# Patient Record
Sex: MALE | Race: WHITE | NOT HISPANIC OR LATINO | Employment: FULL TIME | URBAN - METROPOLITAN AREA
[De-identification: names, ages, dates, MRNs, and addresses within clinical notes are randomized per-mention and may not be internally consistent; named-entity substitution may affect disease eponyms.]

---

## 2019-06-12 ENCOUNTER — APPOINTMENT (OUTPATIENT)
Dept: GENERAL RADIOLOGY | Facility: HOSPITAL | Age: 39
End: 2019-06-12

## 2019-06-12 ENCOUNTER — HOSPITAL ENCOUNTER (EMERGENCY)
Facility: HOSPITAL | Age: 39
Discharge: HOME | End: 2019-06-12
Attending: EMERGENCY MEDICINE | Admitting: EMERGENCY MEDICINE

## 2019-06-12 ENCOUNTER — APPOINTMENT (OUTPATIENT)
Dept: CT IMAGING | Facility: HOSPITAL | Age: 39
End: 2019-06-12

## 2019-06-12 VITALS
BODY MASS INDEX: 34.8 KG/M2 | TEMPERATURE: 97.7 F | OXYGEN SATURATION: 94 % | WEIGHT: 235 LBS | SYSTOLIC BLOOD PRESSURE: 120 MMHG | RESPIRATION RATE: 16 BRPM | HEART RATE: 63 BPM | DIASTOLIC BLOOD PRESSURE: 75 MMHG | HEIGHT: 69 IN

## 2019-06-12 DIAGNOSIS — R07.89 ATYPICAL CHEST PAIN: Primary | ICD-10-CM

## 2019-06-12 DIAGNOSIS — R91.1 PULMONARY NODULE: ICD-10-CM

## 2019-06-12 DIAGNOSIS — Z72.0 TOBACCO ABUSE: ICD-10-CM

## 2019-06-12 LAB
ALBUMIN SERPL-MCNC: 4.4 G/DL (ref 3.5–5.2)
ALBUMIN/GLOB SERPL: 1.6 G/DL
ALP SERPL-CCNC: 121 U/L (ref 39–117)
ALT SERPL W P-5'-P-CCNC: 26 U/L (ref 1–41)
ANION GAP SERPL CALCULATED.3IONS-SCNC: 11 MMOL/L
AST SERPL-CCNC: 20 U/L (ref 1–40)
BASOPHILS # BLD AUTO: 0.07 10*3/MM3 (ref 0–0.2)
BASOPHILS NFR BLD AUTO: 0.6 % (ref 0–1.5)
BILIRUB SERPL-MCNC: 0.2 MG/DL (ref 0.2–1.2)
BUN BLD-MCNC: 18 MG/DL (ref 6–20)
BUN/CREAT SERPL: 16.4 (ref 7–25)
CALCIUM SPEC-SCNC: 9.4 MG/DL (ref 8.6–10.5)
CHLORIDE SERPL-SCNC: 102 MMOL/L (ref 98–107)
CO2 SERPL-SCNC: 28 MMOL/L (ref 22–29)
CREAT BLD-MCNC: 1.1 MG/DL (ref 0.76–1.27)
DEPRECATED RDW RBC AUTO: 42 FL (ref 37–54)
EOSINOPHIL # BLD AUTO: 0.46 10*3/MM3 (ref 0–0.4)
EOSINOPHIL NFR BLD AUTO: 3.9 % (ref 0.3–6.2)
ERYTHROCYTE [DISTWIDTH] IN BLOOD BY AUTOMATED COUNT: 13.7 % (ref 12.3–15.4)
GFR SERPL CREATININE-BSD FRML MDRD: 75 ML/MIN/1.73
GLOBULIN UR ELPH-MCNC: 2.7 GM/DL
GLUCOSE BLD-MCNC: 94 MG/DL (ref 65–99)
HCT VFR BLD AUTO: 45.4 % (ref 37.5–51)
HGB BLD-MCNC: 15.2 G/DL (ref 13–17.7)
HOLD SPECIMEN: NORMAL
HOLD SPECIMEN: NORMAL
IMM GRANULOCYTES # BLD AUTO: 0.04 10*3/MM3 (ref 0–0.05)
IMM GRANULOCYTES NFR BLD AUTO: 0.3 % (ref 0–0.5)
LIPASE SERPL-CCNC: 59 U/L (ref 13–60)
LYMPHOCYTES # BLD AUTO: 3.4 10*3/MM3 (ref 0.7–3.1)
LYMPHOCYTES NFR BLD AUTO: 29.1 % (ref 19.6–45.3)
MCH RBC QN AUTO: 28.3 PG (ref 26.6–33)
MCHC RBC AUTO-ENTMCNC: 33.5 G/DL (ref 31.5–35.7)
MCV RBC AUTO: 84.4 FL (ref 79–97)
MONOCYTES # BLD AUTO: 0.64 10*3/MM3 (ref 0.1–0.9)
MONOCYTES NFR BLD AUTO: 5.5 % (ref 5–12)
NEUTROPHILS # BLD AUTO: 7.07 10*3/MM3 (ref 1.7–7)
NEUTROPHILS NFR BLD AUTO: 60.6 % (ref 42.7–76)
NRBC BLD AUTO-RTO: 0 /100 WBC (ref 0–0.2)
NT-PROBNP SERPL-MCNC: <5 PG/ML (ref 5–450)
PLATELET # BLD AUTO: 266 10*3/MM3 (ref 140–450)
PMV BLD AUTO: 11.1 FL (ref 6–12)
POTASSIUM BLD-SCNC: 4.1 MMOL/L (ref 3.5–5.2)
PROT SERPL-MCNC: 7.1 G/DL (ref 6–8.5)
RBC # BLD AUTO: 5.38 10*6/MM3 (ref 4.14–5.8)
SODIUM BLD-SCNC: 141 MMOL/L (ref 136–145)
TROPONIN T SERPL-MCNC: <0.01 NG/ML (ref 0–0.03)
TROPONIN T SERPL-MCNC: <0.01 NG/ML (ref 0–0.03)
WBC NRBC COR # BLD: 11.68 10*3/MM3 (ref 3.4–10.8)
WHOLE BLOOD HOLD SPECIMEN: NORMAL
WHOLE BLOOD HOLD SPECIMEN: NORMAL

## 2019-06-12 PROCEDURE — 83880 ASSAY OF NATRIURETIC PEPTIDE: CPT

## 2019-06-12 PROCEDURE — 84484 ASSAY OF TROPONIN QUANT: CPT

## 2019-06-12 PROCEDURE — 93005 ELECTROCARDIOGRAM TRACING: CPT | Performed by: EMERGENCY MEDICINE

## 2019-06-12 PROCEDURE — 71275 CT ANGIOGRAPHY CHEST: CPT

## 2019-06-12 PROCEDURE — 83690 ASSAY OF LIPASE: CPT

## 2019-06-12 PROCEDURE — 0 IOPAMIDOL PER 1 ML: Performed by: EMERGENCY MEDICINE

## 2019-06-12 PROCEDURE — 71045 X-RAY EXAM CHEST 1 VIEW: CPT

## 2019-06-12 PROCEDURE — 85025 COMPLETE CBC W/AUTO DIFF WBC: CPT

## 2019-06-12 PROCEDURE — 84484 ASSAY OF TROPONIN QUANT: CPT | Performed by: EMERGENCY MEDICINE

## 2019-06-12 PROCEDURE — 99284 EMERGENCY DEPT VISIT MOD MDM: CPT

## 2019-06-12 PROCEDURE — 80053 COMPREHEN METABOLIC PANEL: CPT

## 2019-06-12 PROCEDURE — 93005 ELECTROCARDIOGRAM TRACING: CPT

## 2019-06-12 RX ORDER — ASPIRIN 81 MG/1
81 TABLET ORAL DAILY
Qty: 30 TABLET | Refills: 0 | Status: SHIPPED | OUTPATIENT
Start: 2019-06-12

## 2019-06-12 RX ORDER — SODIUM CHLORIDE 9 MG/ML
125 INJECTION, SOLUTION INTRAVENOUS CONTINUOUS
Status: DISCONTINUED | OUTPATIENT
Start: 2019-06-12 | End: 2019-06-13 | Stop reason: HOSPADM

## 2019-06-12 RX ORDER — NICOTINE 21 MG/24HR
1 PATCH, TRANSDERMAL 24 HOURS TRANSDERMAL EVERY 24 HOURS
Qty: 28 PATCH | Refills: 0 | Status: SHIPPED | OUTPATIENT
Start: 2019-06-12

## 2019-06-12 RX ORDER — NITROGLYCERIN 0.4 MG/1
0.4 TABLET SUBLINGUAL
Qty: 100 TABLET | Refills: 0 | Status: SHIPPED | OUTPATIENT
Start: 2019-06-12

## 2019-06-12 RX ORDER — NICOTINE 21 MG/24HR
1 PATCH, TRANSDERMAL 24 HOURS TRANSDERMAL ONCE
Status: DISCONTINUED | OUTPATIENT
Start: 2019-06-12 | End: 2019-06-13 | Stop reason: HOSPADM

## 2019-06-12 RX ORDER — SODIUM CHLORIDE 0.9 % (FLUSH) 0.9 %
10 SYRINGE (ML) INJECTION AS NEEDED
Status: DISCONTINUED | OUTPATIENT
Start: 2019-06-12 | End: 2019-06-13 | Stop reason: HOSPADM

## 2019-06-12 RX ADMIN — SODIUM CHLORIDE 500 ML: 9 INJECTION, SOLUTION INTRAVENOUS at 20:54

## 2019-06-12 RX ADMIN — IOPAMIDOL 73 ML: 755 INJECTION, SOLUTION INTRAVENOUS at 20:44

## 2020-09-29 ENCOUNTER — HOSPITAL ENCOUNTER (OUTPATIENT)
Dept: ULTRASOUND IMAGING | Facility: HOSPITAL | Age: 40
Discharge: HOME | End: 2020-09-29
Admitting: FAMILY MEDICINE

## 2020-09-29 ENCOUNTER — TRANSCRIBE ORDERS (OUTPATIENT)
Dept: ADMINISTRATIVE | Facility: HOSPITAL | Age: 40
End: 2020-09-29

## 2020-09-29 DIAGNOSIS — N50.811 TESTICULAR PAIN, RIGHT: ICD-10-CM

## 2020-09-29 DIAGNOSIS — N50.811 TESTICULAR PAIN, RIGHT: Primary | ICD-10-CM

## 2020-09-29 PROCEDURE — 76870 US EXAM SCROTUM: CPT

## 2021-08-29 ENCOUNTER — APPOINTMENT (OUTPATIENT)
Dept: GENERAL RADIOLOGY | Facility: HOSPITAL | Age: 41
End: 2021-08-29

## 2021-08-29 ENCOUNTER — HOSPITAL ENCOUNTER (EMERGENCY)
Facility: HOSPITAL | Age: 41
Discharge: HOME | End: 2021-08-29
Attending: STUDENT IN AN ORGANIZED HEALTH CARE EDUCATION/TRAINING PROGRAM | Admitting: STUDENT IN AN ORGANIZED HEALTH CARE EDUCATION/TRAINING PROGRAM

## 2021-08-29 VITALS
HEIGHT: 69 IN | HEART RATE: 70 BPM | SYSTOLIC BLOOD PRESSURE: 119 MMHG | BODY MASS INDEX: 32.58 KG/M2 | RESPIRATION RATE: 18 BRPM | DIASTOLIC BLOOD PRESSURE: 84 MMHG | TEMPERATURE: 98.1 F | OXYGEN SATURATION: 95 % | WEIGHT: 220 LBS

## 2021-08-29 DIAGNOSIS — S46.211A TEAR OF RIGHT BICEPS MUSCLE, INITIAL ENCOUNTER: Primary | ICD-10-CM

## 2021-08-29 PROCEDURE — 99283 EMERGENCY DEPT VISIT LOW MDM: CPT

## 2021-08-29 PROCEDURE — 73030 X-RAY EXAM OF SHOULDER: CPT

## 2021-08-29 PROCEDURE — 73070 X-RAY EXAM OF ELBOW: CPT

## 2021-08-29 RX ORDER — ACETAMINOPHEN 500 MG
1000 TABLET ORAL ONCE
Status: COMPLETED | OUTPATIENT
Start: 2021-08-29 | End: 2021-08-29

## 2021-08-29 RX ORDER — IBUPROFEN 800 MG/1
800 TABLET ORAL ONCE
Status: COMPLETED | OUTPATIENT
Start: 2021-08-29 | End: 2021-08-29

## 2021-08-29 RX ORDER — OXYCODONE HYDROCHLORIDE 5 MG/1
5 TABLET ORAL EVERY 6 HOURS PRN
Qty: 8 TABLET | Refills: 0 | Status: SHIPPED | OUTPATIENT
Start: 2021-08-29 | End: 2021-10-21

## 2021-08-29 RX ADMIN — IBUPROFEN 800 MG: 800 TABLET, FILM COATED ORAL at 21:52

## 2021-08-29 RX ADMIN — ACETAMINOPHEN 1000 MG: 500 TABLET ORAL at 21:52

## 2021-09-02 ENCOUNTER — OFFICE VISIT (OUTPATIENT)
Dept: ORTHOPEDIC SURGERY | Facility: CLINIC | Age: 41
End: 2021-09-02

## 2021-09-02 VITALS
HEART RATE: 77 BPM | DIASTOLIC BLOOD PRESSURE: 92 MMHG | SYSTOLIC BLOOD PRESSURE: 128 MMHG | BODY MASS INDEX: 32.59 KG/M2 | HEIGHT: 69 IN | WEIGHT: 220.02 LBS

## 2021-09-02 DIAGNOSIS — S59.901A INJURY OF RIGHT ELBOW, INITIAL ENCOUNTER: Primary | ICD-10-CM

## 2021-09-02 PROCEDURE — 99204 OFFICE O/P NEW MOD 45 MIN: CPT | Performed by: ORTHOPAEDIC SURGERY

## 2021-09-02 NOTE — PROGRESS NOTES
[facility] Orthopaedic Surgery Clinic Note        Subjective     Pain of the Right Elbow      HPI    Amarjit Monsivais is a 41 y.o. male who presents with new problem of: right elbow pain.  Onset: twisting injury. The issue has been ongoing for 4 day(s). Pain is a 2/10 on the pain scale. Pain is described as dull. Associated symptoms include pain and swelling. The pain is worse with working and leisure; resting and pain medication and/or NSAID improve the pain. Previous treatments have included: bracing and NSAIDS.    I have reviewed the following portions of the patient's history:History of Present Illness and review of systems.        Patient is a right-hand-dominant male who lifts weights 5 days a week who was stabilizing a canoe when he lost his footing and felt a tearing sensation in his right elbow.  Date of injury is 4 days ago.  Patient is here for further evaluation and treatment.        History reviewed. No pertinent past medical history.   Past Surgical History:   Procedure Laterality Date   • APPENDECTOMY     • EYE SURGERY  2007    METAL IN EYE    • FINGER SURGERY  2009    RIGHT THUMB   • HERNIA REPAIR     • KNEE SURGERY  2013      History reviewed. No pertinent family history.  Social History     Socioeconomic History   • Marital status:      Spouse name: Not on file   • Number of children: Not on file   • Years of education: Not on file   • Highest education level: Not on file   Tobacco Use   • Smoking status: Current Every Day Smoker     Types: Cigarettes, Electronic Cigarette   • Smokeless tobacco: Never Used   • Tobacco comment: 2-4 CIGS PER DAY    Substance and Sexual Activity   • Alcohol use: Yes     Alcohol/week: 3.0 - 4.0 standard drinks     Types: 3 - 4 Cans of beer per week   • Drug use: Yes     Types: Marijuana     Comment: COUPLE TIMES A WEEK    • Sexual activity: Defer      Current Outpatient Medications on File Prior to Visit   Medication Sig Dispense Refill   • nicotine (NICODERM CQ) 21  "MG/24HR patch Place 1 patch on the skin as directed by provider Daily. 28 patch 0   • aspirin 81 MG EC tablet Take 1 tablet by mouth Daily. 30 tablet 0   • nitroglycerin (NITROSTAT) 0.4 MG SL tablet Place 1 tablet under the tongue Every 5 (Five) Minutes As Needed for Chest Pain. Take no more than 3 doses in 15 minutes. 100 tablet 0   • oxyCODONE (Roxicodone) 5 MG immediate release tablet Take 1 tablet by mouth Every 6 (Six) Hours As Needed for Severe Pain . 8 tablet 0     No current facility-administered medications on file prior to visit.      Allergies   Allergen Reactions   • Bactrim [Sulfamethoxazole-Trimethoprim] Myalgia   • Sulfa Antibiotics Myalgia          Review of Systems   Constitutional: Negative.    HENT: Negative.    Eyes: Negative.    Respiratory: Negative.    Cardiovascular: Negative.    Gastrointestinal: Negative.    Endocrine: Negative.    Genitourinary: Negative.    Musculoskeletal: Positive for arthralgias.   Skin: Negative.    Allergic/Immunologic: Negative.    Neurological: Negative.    Hematological: Negative.    Psychiatric/Behavioral: Negative.         I reviewed the patient's chief complaint, history of present illness, review of systems, past medical history, surgical history, family history, social history, medications and allergy list.        Objective      Physical Exam  /92   Pulse 77   Ht 175.3 cm (69.02\")   Wt 99.8 kg (220 lb 0.3 oz)   BMI 32.48 kg/m²     Body mass index is 32.48 kg/m².    General  Mental Status - alert  General Appearance - cooperative, well groomed, not in acute distress  Orientation - Oriented X3  Build & Nutrition - well developed and well nourished  Posture - normal posture  Gait - normal gait       Ortho Exam  Right elbow: Patient has a positive hook test.  Patient has ecchymosis at the distal aspect of the biceps.  He has pain with range of motion of the elbow.    Imaging/Studies  Imaging Results (Last 24 Hours)     ** No results found for the last " 24 hours. **        XR Shoulder 2+ View Right  Narrative: CR Shoulder Comp Min 2 Vws RT    INDICATION:   Patient fell with right shoulder and elbow pain.    COMPARISON:   None available.    FINDINGS:   3 views of the right shoulder.   No fracture or dislocation.  The acromioclavicular and glenohumeral articulations are within normal limits.  No foreign body.  Impression: Negative right shoulder.    Signer Name: MARYAM Cerda MD   Signed: 8/29/2021 8:15 PM   Workstation Name: [facility]    [facility]  XR Elbow 2 View Right  Narrative: CR Elbow 2 Vws RT    INDICATION:   Patient fell with shoulder and elbow pain.    COMPARISON:   None available    FINDINGS:   2 views of the right elbow.  No fracture, dislocation, or effusion.  No bone erosion or destruction.  No foreign body.  Impression: Negative right elbow.    Signer Name: MARYAM Cerda MD   Signed: 8/29/2021 8:14 PM   Workstation Name: [facility]    Radiology [facility]      We reviewed images and report of the x-ray above.  No acute bony abnormalities noted.    Assessment    Assessment:  1. Injury of right elbow, initial encounter        Plan:  1. Continue over-the-counter medication as needed for discomfort  2. Right elbow injury with presumed distal biceps rupture--MRI will be ordered to confirm the diagnosis.  We also need to assess the level of rupture and hopefully this is distal and not musculotendinous junction.  We will call him with the results but provisionally plan on fixing this early next week.  The risk, benefits, potential hazards as well as typical recovery and rehab were discussed.  Provided the MRI confirms a distal rupture, plans will be made for reinsertion early next week.        Beau Del Real MD  09/02/21  14:08 EDT    Dragon disclaimer:  Much of this encounter note is an electronic transcription/translation of spoken language to printed text. The electronic translation of spoken  language may permit erroneous, or at times, nonsensical words or phrases to be inadvertently transcribed; Although I have reviewed the note for such errors, some may still exist.

## 2021-09-05 ENCOUNTER — APPOINTMENT (OUTPATIENT)
Dept: PREADMISSION TESTING | Facility: HOSPITAL | Age: 41
End: 2021-09-05

## 2021-09-06 ENCOUNTER — APPOINTMENT (OUTPATIENT)
Dept: PREADMISSION TESTING | Facility: HOSPITAL | Age: 41
End: 2021-09-06

## 2021-09-06 LAB — SARS-COV-2 RNA PNL SPEC NAA+PROBE: NOT DETECTED

## 2021-09-06 PROCEDURE — U0004 COV-19 TEST NON-CDC HGH THRU: HCPCS

## 2021-09-06 PROCEDURE — C9803 HOPD COVID-19 SPEC COLLECT: HCPCS

## 2021-09-07 DIAGNOSIS — S59.901A INJURY OF RIGHT ELBOW, INITIAL ENCOUNTER: ICD-10-CM

## 2021-09-08 ENCOUNTER — OUTSIDE FACILITY SERVICE (OUTPATIENT)
Dept: ORTHOPEDIC SURGERY | Facility: CLINIC | Age: 41
End: 2021-09-08

## 2021-09-08 DIAGNOSIS — S46.211D RUPTURE OF RIGHT DISTAL BICEPS TENDON, SUBSEQUENT ENCOUNTER: Primary | ICD-10-CM

## 2021-09-08 PROCEDURE — 24342 REPAIR OF RUPTURED TENDON: CPT | Performed by: ORTHOPAEDIC SURGERY

## 2021-09-08 RX ORDER — IBUPROFEN 800 MG/1
800 TABLET ORAL 3 TIMES DAILY
Qty: 90 TABLET | Refills: 1 | OUTPATIENT
Start: 2021-09-08 | End: 2022-05-23

## 2021-09-23 ENCOUNTER — OFFICE VISIT (OUTPATIENT)
Dept: ORTHOPEDIC SURGERY | Facility: CLINIC | Age: 41
End: 2021-09-23

## 2021-09-23 VITALS — TEMPERATURE: 98.4 F

## 2021-09-23 DIAGNOSIS — S46.211D RUPTURE OF RIGHT DISTAL BICEPS TENDON, SUBSEQUENT ENCOUNTER: ICD-10-CM

## 2021-09-23 DIAGNOSIS — Z98.890 S/P MUSCULOSKELETAL SYSTEM SURGERY: Primary | ICD-10-CM

## 2021-09-23 PROCEDURE — 99024 POSTOP FOLLOW-UP VISIT: CPT | Performed by: PHYSICIAN ASSISTANT

## 2021-09-23 NOTE — PROGRESS NOTES
[facility] [place]    Subjective     Chief Complaint   Patient presents with   • Post-op     2 weeks s/p Reinsertion right distal biceps tendon with anthrex button implant set; DOS 09.08.2021        MAYRA Monsivais is a 41 y.o. male.  Patient presents for their initial postop visit following reinsertion right distal biceps performed on the above date by Dr. Del Real.    Patient is doing well.  He endorses a pain scale of 2/10.  No reported numbness or tingling.  He does have some spasming noted into the forearm region.  He also notes some mild swelling noted to the distal bicep area.    No reported fever, chills, night sweats or other constitutional symptoms.    History reviewed. No pertinent past medical history.   Past Surgical History:   Procedure Laterality Date   • APPENDECTOMY     • EYE SURGERY  2007    METAL IN EYE    • FINGER SURGERY  2009    RIGHT THUMB   • HERNIA REPAIR     • KNEE SURGERY  2013      History reviewed. No pertinent family history.  Social History     Socioeconomic History   • Marital status:      Spouse name: Not on file   • Number of children: Not on file   • Years of education: Not on file   • Highest education level: Not on file   Tobacco Use   • Smoking status: Current Every Day Smoker     Types: Cigarettes, Electronic Cigarette   • Smokeless tobacco: Never Used   • Tobacco comment: 2-4 CIGS PER DAY    Substance and Sexual Activity   • Alcohol use: Yes     Alcohol/week: 3.0 - 4.0 standard drinks     Types: 3 - 4 Cans of beer per week   • Drug use: Yes     Types: Marijuana     Comment: COUPLE TIMES A WEEK    • Sexual activity: Defer      Current Outpatient Medications on File Prior to Visit   Medication Sig Dispense Refill   • aspirin 81 MG EC tablet Take 1 tablet by mouth Daily. 30 tablet 0   • ibuprofen (ADVIL,MOTRIN) 800 MG tablet Take 1 tablet by mouth 3 (Three) Times a Day. 90 tablet 1   • nicotine (NICODERM CQ) 21 MG/24HR patch Place 1 patch on the  skin as directed by provider Daily. 28 patch 0   • nitroglycerin (NITROSTAT) 0.4 MG SL tablet Place 1 tablet under the tongue Every 5 (Five) Minutes As Needed for Chest Pain. Take no more than 3 doses in 15 minutes. 100 tablet 0   • oxyCODONE (Roxicodone) 5 MG immediate release tablet Take 1 tablet by mouth Every 6 (Six) Hours As Needed for Severe Pain . 8 tablet 0     No current facility-administered medications on file prior to visit.      Allergies   Allergen Reactions   • Bactrim [Sulfamethoxazole-Trimethoprim] Myalgia   • Sulfa Antibiotics Myalgia        The following portions of the patient's history were reviewed and updated as appropriate: allergies, current medications, past family history, past medical history, past social history, past surgical history and problem list.    Review of Systems   Constitutional: Negative.    HENT: Negative.    Eyes: Negative.    Respiratory: Negative.    Cardiovascular: Negative.    Gastrointestinal: Negative.    Endocrine: Negative.    Genitourinary: Negative.    Musculoskeletal: Positive for arthralgias.   Skin: Negative.    Allergic/Immunologic: Negative.    Neurological: Negative.    Hematological: Negative.    Psychiatric/Behavioral: Negative.         Objective      Physical Exam  Temp 98.4 °F (36.9 °C)     There is no height or weight on file to calculate BMI.    GENERAL APPEARANCE: awake, alert & oriented x 3, in no acute distress and well developed, well nourished  PSYCH: normal mood and affect  LUNGS:  breathing nonlabored, no wheezing  EYES: sclera anicteric, pupils equal  CARDIOVASCULAR: palpable pulses. Capillary refill less than 2 seconds  INTEGUMENTARY: skin intact, no clubbing, cyanosis  NEUROLOGIC:  Normal Sensation         Ortho Exam    Right Upper Extremity:      Musculoskeletal     Inspection and Palpation:      Hand/Wrist:      Tenderness -minimal    Swelling -palpable swelling noted along distal biceps.     ROM: 20 to 130 degrees.    Motor: Grossly intact  radial, ulnar, median, AIN, PIN.    Sensory: Grossly intact to light touch radial, ulnar, median nerve distributions.    Vascular: 2+ radial pulse with brisk capillary refill noted and each digit.       Incision:  Healing appropriately Monocryl sutures and Steri-Strips in place.  No redness, warmth, drainage or evidence of infection.      No evidence of Ralph deformity.      Imaging/Studies  No new imaging today.      Assessment/Plan        ICD-10-CM ICD-9-CM   1. S/P musculoskeletal system surgery  Z98.890 V45.89   2. Rupture of right distal biceps tendon, subsequent encounter  S46.211D V58.89     841.8       No orders of the defined types were placed in this encounter.       -Status post reinsertion of right bicep tendon, stable.  -Patient was placed into a hinged elbow brace with a 20 degree extension block.  -No weightbearing to the right upper extremity.  -Recommend over-the-counter pain medications as needed.  -Follow-up in 4 weeks with Dr. Del Real.  -Questions and concerns answered.      Natalie Willoughby PA-C  09/23/21  14:30 EDT               EMR Dragon/Transcription disclaimer:  Much of this encounter note is an electronic transcription of spoken language to printed text. Electronic transcription of spoken language may permit erroneous, or at times, nonsensical words or phrases to be inadvertently transcribed. Although I have reviewed the note for such errors, some may still exist.

## 2021-10-21 ENCOUNTER — OFFICE VISIT (OUTPATIENT)
Dept: ORTHOPEDIC SURGERY | Facility: CLINIC | Age: 41
End: 2021-10-21

## 2021-10-21 DIAGNOSIS — Z98.890 S/P MUSCULOSKELETAL SYSTEM SURGERY: Primary | ICD-10-CM

## 2021-10-21 DIAGNOSIS — S46.211D RUPTURE OF RIGHT DISTAL BICEPS TENDON, SUBSEQUENT ENCOUNTER: ICD-10-CM

## 2021-10-21 PROCEDURE — 99024 POSTOP FOLLOW-UP VISIT: CPT | Performed by: ORTHOPAEDIC SURGERY

## 2021-10-21 NOTE — PROGRESS NOTES
[facility] [place] Orthopaedic Surgery Clinic Note        Subjective     Post-op (4 week f/u-6 weeks s/p Reinsertion right distal biceps tendon with anthrex button implant set; DOS 09.08.2021)       MAYRA Monsivais is a 41 y.o. male.  Patient is now 6 weeks out from reinsertion of a right acute distal biceps tendon rupture.  Patient is doing well with no major complaints.  He is ready to get rid of his brace.  He is eager to return to bow hunting.          Objective      Physical Exam  There were no vitals taken for this visit.    There is no height or weight on file to calculate BMI.        Ortho Exam  Range of motion 0-140  Incision is healed  Full supination pronation  Negative hook test    Imaging Reviewed:  Imaging Results (Last 24 Hours)     ** No results found for the last 24 hours. **            Assessment    Assessment:  1. S/P musculoskeletal system surgery    2. Rupture of right distal biceps tendon, subsequent encounter        Plan:  1. Status post reinsertion right distal biceps tendon--at this point, he is 6 weeks out from his surgery and I think it is too soon for him to use a 70 pound bow to hunt.  I have shared as much with him today.  I would like for him to sit this season out and I think he can safely return next year.  I will see him back in 6 weeks.  I have asked him to continue using the brace and restrictions at work.  He can be out of the brace at all other times.  Needs to follow the lifting restrictions for now.      Beau Del Real MD  10/21/21  12:56 TRACEYT      Dragon disclaimer:  Much of this encounter note is an electronic transcription/translation of spoken language to printed text. The electronic translation of spoken language may permit erroneous, or at times, nonsensical words or phrases to be inadvertently transcribed; Although I have reviewed the note for such errors, some may still exist.

## 2021-12-07 ENCOUNTER — OFFICE VISIT (OUTPATIENT)
Dept: ORTHOPEDIC SURGERY | Facility: CLINIC | Age: 41
End: 2021-12-07

## 2021-12-07 DIAGNOSIS — S46.211D RUPTURE OF RIGHT DISTAL BICEPS TENDON, SUBSEQUENT ENCOUNTER: Primary | ICD-10-CM

## 2021-12-07 PROCEDURE — 99024 POSTOP FOLLOW-UP VISIT: CPT | Performed by: ORTHOPAEDIC SURGERY

## 2021-12-07 NOTE — PROGRESS NOTES
[facility] [place] Orthopaedic Surgery Clinic Note        Subjective     Post-op Follow-up (6.5 week f/u, 3 months s/p Reinsertion right distal biceps tendon with anthrex button implant set; DOS 09.08.2021)       MAYRA Monsivais is a 41 y.o. male.  Patient is now 3 months out from reinsertion of a right distal biceps tendon.  Patient is doing well overall.  No complaints of pain.  He started to do some lifting albeit light weight.          Objective      Physical Exam  There were no vitals taken for this visit.    There is no height or weight on file to calculate BMI.        Ortho Exam  Patient has full elbow extension and full flexion.  Full supination pronation.  He has some atrophy of his biceps muscle belly.    Imaging Reviewed:  Imaging Results (Last 24 Hours)     ** No results found for the last 24 hours. **            Assessment    Assessment:  1. Rupture of right distal biceps tendon, subsequent encounter        Plan:  1. Status post reinsertion right distal biceps tendon--patient will be released today.  He can resume weightlifting with low weight and high repetition.  I recommended bands more than free weights for another 6 to 8 weeks.  He is reassured me that he will be careful going forward.  I will see him back as needed.      Beau Del Real MD  12/07/21  17:59 EST      Dictated Utilizing Dragon Dictation.

## 2022-05-23 ENCOUNTER — HOSPITAL ENCOUNTER (EMERGENCY)
Facility: HOSPITAL | Age: 42
Discharge: HOME | End: 2022-05-23
Attending: EMERGENCY MEDICINE | Admitting: EMERGENCY MEDICINE

## 2022-05-23 ENCOUNTER — APPOINTMENT (OUTPATIENT)
Dept: GENERAL RADIOLOGY | Facility: HOSPITAL | Age: 42
End: 2022-05-23

## 2022-05-23 VITALS
BODY MASS INDEX: 30.36 KG/M2 | HEART RATE: 60 BPM | WEIGHT: 205 LBS | RESPIRATION RATE: 17 BRPM | SYSTOLIC BLOOD PRESSURE: 153 MMHG | HEIGHT: 69 IN | TEMPERATURE: 98 F | OXYGEN SATURATION: 98 % | DIASTOLIC BLOOD PRESSURE: 105 MMHG
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
## 2022-05-23 DIAGNOSIS — S97.81XA CRUSHING INJURY OF RIGHT FOOT, INITIAL ENCOUNTER: Primary | ICD-10-CM

## 2022-05-23 PROCEDURE — 73630 X-RAY EXAM OF FOOT: CPT

## 2022-05-23 PROCEDURE — 96372 THER/PROPH/DIAG INJ SC/IM: CPT

## 2022-05-23 PROCEDURE — 25010000002 TETANUS-DIPHTH-ACELL PERTUSSIS 5-2.5-18.5 LF-MCG/0.5 SUSPENSION PREFILLED SYRINGE: Performed by: EMERGENCY MEDICINE

## 2022-05-23 PROCEDURE — 25010000002 KETOROLAC TROMETHAMINE PER 15 MG: Performed by: EMERGENCY MEDICINE

## 2022-05-23 PROCEDURE — 99283 EMERGENCY DEPT VISIT LOW MDM: CPT

## 2022-05-23 PROCEDURE — 90471 IMMUNIZATION ADMIN: CPT | Performed by: EMERGENCY MEDICINE

## 2022-05-23 PROCEDURE — 90715 TDAP VACCINE 7 YRS/> IM: CPT | Performed by: EMERGENCY MEDICINE

## 2022-05-23 RX ORDER — KETOROLAC TROMETHAMINE 10 MG/1
10 TABLET, FILM COATED ORAL EVERY 6 HOURS PRN
Qty: 20 TABLET | Refills: 0 | Status: SHIPPED | OUTPATIENT
Start: 2022-05-23

## 2022-05-23 RX ORDER — HYDROCODONE BITARTRATE AND ACETAMINOPHEN 5; 325 MG/1; MG/1
1 TABLET ORAL EVERY 4 HOURS PRN
Qty: 10 TABLET | Refills: 0 | Status: SHIPPED | OUTPATIENT
Start: 2022-05-23

## 2022-05-23 RX ORDER — HYDROCODONE BITARTRATE AND ACETAMINOPHEN 5; 325 MG/1; MG/1
1 TABLET ORAL ONCE
Status: COMPLETED | OUTPATIENT
Start: 2022-05-23 | End: 2022-05-23

## 2022-05-23 RX ORDER — KETOROLAC TROMETHAMINE 30 MG/ML
30 INJECTION, SOLUTION INTRAMUSCULAR; INTRAVENOUS ONCE
Status: COMPLETED | OUTPATIENT
Start: 2022-05-23 | End: 2022-05-23

## 2022-05-23 RX ADMIN — HYDROCODONE BITARTRATE AND ACETAMINOPHEN 1 TABLET: 5; 325 TABLET ORAL at 18:08

## 2022-05-23 RX ADMIN — KETOROLAC TROMETHAMINE 30 MG: 30 INJECTION, SOLUTION INTRAMUSCULAR; INTRAVENOUS at 18:07

## 2022-05-23 RX ADMIN — TETANUS TOXOID, REDUCED DIPHTHERIA TOXOID AND ACELLULAR PERTUSSIS VACCINE, ADSORBED 0.5 ML: 5; 2.5; 8; 8; 2.5 SUSPENSION INTRAMUSCULAR at 18:08

## 2022-05-23 NOTE — DISCHARGE INSTRUCTIONS
Keep the foot elevated and ice it throughout the remainder of the day today.  Keep the foot elevated is much as possible for the next 48 to 72 hours.  Return to the ER for any concerns.  Recheck in 1 week for clearance to return to work.  Keep the wound clean and dry.

## 2022-05-23 NOTE — ED PROVIDER NOTES
Subjective   The patient presents to the emergency department with a work-related injury.  Patient reports that a forklift came down on his right foot.  He reports pain of the right foot, worse with any pressure or movement.  Patient is not on any anticoagulation.  No other acute injuries or complaints.  Patient presents with pain and swelling on the dorsal aspect of the midfoot.      History provided by:  Patient      Review of Systems   Constitutional: Negative.    Respiratory: Negative.    Cardiovascular: Negative.    Musculoskeletal: Negative for back pain and neck pain.        Right foot injury   Skin: Positive for wound.   Allergic/Immunologic: Negative for immunocompromised state.   Hematological: Does not bruise/bleed easily.       No past medical history on file.    Allergies   Allergen Reactions   • Bactrim [Sulfamethoxazole-Trimethoprim] Myalgia   • Sulfa Antibiotics Myalgia       Past Surgical History:   Procedure Laterality Date   • APPENDECTOMY     • ELBOW PROCEDURE Right 09/08/2021     Reinsertion right distal biceps tendon with anthrex button implant set   • EYE SURGERY  2007    METAL IN EYE    • FINGER SURGERY  2009    RIGHT THUMB   • HERNIA REPAIR     • KNEE SURGERY  2013       No family history on file.    Social History     Socioeconomic History   • Marital status:    Tobacco Use   • Smoking status: Current Every Day Smoker     Types: Cigarettes, Electronic Cigarette   • Smokeless tobacco: Never Used   • Tobacco comment: 2-4 CIGS PER DAY    Substance and Sexual Activity   • Alcohol use: Yes     Alcohol/week: 3.0 - 4.0 standard drinks     Types: 3 - 4 Cans of beer per week   • Drug use: Yes     Types: Marijuana     Comment: COUPLE TIMES A WEEK    • Sexual activity: Defer           Objective   Physical Exam  Vitals and nursing note reviewed.   Constitutional:       General: He is not in acute distress.     Appearance: Normal appearance. He is obese. He is not toxic-appearing.    Cardiovascular:      Pulses: Normal pulses.   Musculoskeletal:         General: Swelling, tenderness and signs of injury present.      Comments: Patient with tenderness, swelling, and abrasion noted in the dorsal aspect of the right foot.  Neurovascular intact.  Ankle is intact.  No lacerations noted.  No gross deformity.   Skin:     Comments: Abrasion noted on the dorsal aspect of the right foot.   Neurological:      Mental Status: He is alert and oriented to person, place, and time.   Psychiatric:         Mood and Affect: Mood normal.         Behavior: Behavior normal.         Procedures           ED Course  ED Course as of 05/23/22 2105   Mon May 23, 2022   1749 XR Foot 3+ View Right  Personally reviewed 3 views of the foot demonstrating soft tissue swelling of the dorsum of the foot but no acute fracture or dislocation.  See report from radiology for details. [RS]   3709 Patient with crush injury of the right foot with abrasion.  I talked with him about expected course, symptomatic management, keeping the foot elevated, and icing for the next bit.  We will supply an Ortho boot.  The patient has crutches at home.  I advised on sitdown duty at work until cleared by his doctor.  He is to see his doctor in 1 week for reevaluation and clearance for regular duties. I had a discussion with the patient/family regarding diagnosis, diagnostic results, treatment plan, and medications.  The patient/family indicated understanding of these instructions.  I spent adequate time at the bedside proceeding discharge necessary to personally discuss the aftercare instructions, giving patient education, providing explanations of the results of our evaluations/findings, and my decision making to assure that the patient/family understand the plan of care.  Time was allotted to answer questions at that time and throughout the ED course.  Emphasis was placed on timely follow-up after discharge.  I also discussed the potential for the  "development of an acute emergent condition requiring further evaluation, admission, or even surgical intervention. I discussed that we found nothing during the visit today indicating the need for further workup, admission, or the presence of an unstable medical condition.  I encouraged the patient to return to the emergency department immediately for ANY concerns, worsening, new complaints, or if symptoms persist and unable to seek follow-up in a timely fashion.  The patient/family expressed understanding and agreement with this plan.  [RS]      ED Course User Index  [RS] Aiden Abad MD                                   [facility] reviewed by Aiden Abad MD             MDM  Number of Diagnoses or Management Options  Crushing injury of right foot, initial encounter  Work related injury  Diagnosis management comments: No results found for this or any previous visit (from the past 24 hour(s)).  Note: In addition to lab results from this visit, the labs listed above may include labs taken at another facility or during a different encounter within the last 24 hours. Please correlate lab times with ED admission and discharge times for further clarification of the services performed during this visit.    XR Foot 3+ View Right   Final Result    Dorsal foot soft tissue swelling without evidence of fracture or    malalignment.         This report was finalized on 5/23/2022 5:11 PM by Aiden Taylor MD.          -------------------------------------              05/23/22 05/23/22                  1635         1641      -------------------------------------   BP:                    (!) 153/105   Pulse:        60                     Resp:         17                     Temp:   98 °F (36.7 °C)              SpO2:         98%                    Weight: 93 kg (205 lb)               Height: 175.3 cm (69\")            "   -------------------------------------  Medications  HYDROcodone-acetaminophen (NORCO) 5-325 MG per tablet 1 tablet (1 tablet Oral Given 5/23/22 1808)  ketorolac (TORADOL) injection 30 mg (30 mg Intramuscular Given 5/23/22 1807)   Tetanus-Diphth-Acell Pertussis (BOOSTRIX) injection 0.5 mL (0.5 mL Intramuscular Given 5/23/22 1808)  ECG/EMG Results (last 24 hours)     ** No results found for the last 24 hours. **      No orders to display         Amount and/or Complexity of Data Reviewed  Tests in the radiology section of CPT®: reviewed  Independent visualization of images, tracings, or specimens: yes        Final diagnoses:   Crushing injury of right foot, initial encounter   Work related injury       ED Disposition  ED Disposition     ED Disposition   Discharge    Condition   Stable    Comment   --             Brionna Monroe MD  [address]  [place] [id number] [phone]    In 1 week  For wound re-check    [facility] Emergency Department  [address] [address] [phone]    As needed, If symptoms worsen or ANY concerns.         Medication List      New Prescriptions    HYDROcodone-acetaminophen 5-325 MG per tablet  Commonly known as: NORCO  Take 1 tablet by mouth Every 4 (Four) Hours As Needed for Moderate Pain  or Severe Pain .     ketorolac 10 MG tablet  Commonly known as: TORADOL  Take 1 tablet by mouth Every 6 (Six) Hours As Needed for Moderate Pain . Received a dose in the ER.        Stop    ibuprofen 800 MG tablet  Commonly known as: ADVIL,MOTRIN           Where to Get Your Medications      These medications were sent to [id number] - [place], [place] - [address] - [phone]  - [phone]   [address] [facility] [address]    Phone: [phone]   · HYDROcodone-acetaminophen 5-325 MG per tablet  · ketorolac 10 MG tablet          Aiden Abad MD  05/23/22  3467

## 2023-04-13 ENCOUNTER — OFFICE VISIT (OUTPATIENT)
Dept: ORTHOPEDIC SURGERY | Facility: CLINIC | Age: 43
End: 2023-04-13
Payer: COMMERCIAL

## 2023-04-13 VITALS
HEIGHT: 69 IN | DIASTOLIC BLOOD PRESSURE: 92 MMHG | SYSTOLIC BLOOD PRESSURE: 132 MMHG | WEIGHT: 222 LBS | BODY MASS INDEX: 32.88 KG/M2

## 2023-04-13 DIAGNOSIS — M19.011 ARTHRITIS OF RIGHT ACROMIOCLAVICULAR JOINT: ICD-10-CM

## 2023-04-13 DIAGNOSIS — M25.511 RIGHT SHOULDER PAIN, UNSPECIFIED CHRONICITY: Primary | ICD-10-CM

## 2023-04-13 DIAGNOSIS — M75.101 ROTATOR CUFF SYNDROME OF RIGHT SHOULDER: ICD-10-CM

## 2023-04-13 PROCEDURE — 99214 OFFICE O/P EST MOD 30 MIN: CPT | Performed by: ORTHOPAEDIC SURGERY

## 2023-04-13 NOTE — PROGRESS NOTES
[facility] [place] [facility] [place]        Subjective     CC: Pain of the Right Shoulder      HPI    Amarjit Monsivais is a 42 y.o. male who presents with new problem of: right shoulder pain.  Onset: atraumatic and gradual in nature. The issue has been ongoing for 2-3 week(s). Pain is a 3-8/10 on the pain scale. Pain is described as dull, aching and stabbing. Associated symptoms include pain, grinding and stiffness. The pain is worse with sleeping, working, leisure, lying on affected side and certain movements; nothing improve the pain. Previous treatments have included: NSAIDS.    I have reviewed the following portions of the patient's history:History of Present Illness and review of systems.      Patient here today for new problem today regarding his right shoulder.  I repaired a distal biceps rupture on 2021 and the patient did relatively well after that procedure.  He tells me for the last few years, he has had right shoulder pain.  This has gradually worsened.  He used to have difficulty anterolaterally with lifting and now he has significant pain when he lifts a coffee cup out of his shoulder in his car.  He has pain with abduction.  He has had no treatment thus far.    ROS:    Constiutional:Pt denies fever, chills, nausea, or vomiting.  MSK:as above        Objective      Past Medical History  History reviewed. No pertinent past medical history.  Social History     Socioeconomic History   • Marital status:    Tobacco Use   • Smoking status: Former     Types: Cigarettes, Electronic Cigarette     Quit date:      Years since quittin.2   • Smokeless tobacco: Never   • Tobacco comments:     Uses Zyn pouch-contains nicotine   Vaping Use   • Vaping Use: Former   Substance and Sexual Activity   • Alcohol use: Yes     Alcohol/week: 3.0 - 4.0 standard drinks     Types: 3 - 4 Cans of beer per week   • Drug use: Yes     Types: Marijuana     Comment: COUPLE TIMES A WEEK    • Sexual activity: Defer     "      Physical Exam  /92   Ht 175.3 cm (69.02\")   Wt 101 kg (222 lb)   BMI 32.77 kg/m²     Body mass index is 32.77 kg/m².    Patient is well nourished and well developed.        Ortho Exam  Musculoskeletal   Upper Extremity   Right Shoulder     Inspection and Palpation:     Medial border scapular tenderness-none    AC Joint Tenderness -moderate    Sensation is normal    Examination reveals no ecchymosis.        Strength and Tone:    Supraspinatus - 5/5 with pain    External Rotators-5/5    Infraspinatus - 5/5    Subscapularis - 5/5    Deltoid - 5/5     Range of Motion      RightShoulder:    Internal Rotation: ROM - L4    External Rotation: AROM - 60 degrees    Elevation through flexion: AROM - 140 degrees        Impingement   Right shoulder    Shah-Perry impingement test positive    Neer impingement test negative     Functional Testing   Right shoulder    AC crossover adduction test positive    Speeds test negative    Uppercut test negative    O'Briens test negative    Drop arm sign negative    Apprehension relocation negative      Imaging/Labs/EMG Reviewed:  Imaging Results (Last 24 Hours)     Procedure Component Value Units Date/Time    XR Shoulder 2+ View Right [[id number]] Resulted: 04/13/23 1209     Updated: 04/13/23 1210    Narrative:      Right Shoulder X-Ray    Indication: Pain    Study:  AP, axillary lateral, and scapular Y views    Comparison: Right shoulder 8/29/2021    Findings:  No acute fractures are visualized  No bony lesions are visualized.  Normal soft tissue appearance  AC joint: Severe hypertrophic joint space narrowing  Glenohumeral joint: Minimal joint space narrowing  Acromion type: 2  Changes at greater tuberosity consistent with chronic cuff pathology      Impression:    No acute bony abnormalities noted  Type II acromion  Severe hypertrophic AC joint space narrowing              Assessment    Assessment:  1. Right shoulder pain, unspecified chronicity    2. Rotator cuff " syndrome of right shoulder    3. Arthritis of right acromioclavicular joint        Plan:  1. Recommend over the counter anti-inflammatories for pain and/or swelling  2. Chronic right shoulder pain with rotator cuff syndrome and AC joint arthritis--given duration of symptoms, this points towards the AC joint but is anterolateral pain is worrisome for rotator cuff pathology and an MRI will be requested and I will see him back to review that study and we will pay close attention to the rotator cuff anteriorly, the long of the biceps, the supraspinatus, and the AC joint.      Beau Del Real MD  04/13/23  13:15 EDT      Dictated Utilizing Dragon Dictation.

## 2023-05-08 ENCOUNTER — TREATMENT (OUTPATIENT)
Dept: PHYSICAL THERAPY | Facility: CLINIC | Age: 43
End: 2023-05-08
Payer: COMMERCIAL

## 2023-05-08 DIAGNOSIS — G89.29 CHRONIC RIGHT SHOULDER PAIN: Primary | ICD-10-CM

## 2023-05-08 DIAGNOSIS — R29.898 UPPER EXTREMITY WEAKNESS: ICD-10-CM

## 2023-05-08 DIAGNOSIS — M25.511 CHRONIC RIGHT SHOULDER PAIN: Primary | ICD-10-CM

## 2023-05-08 PROCEDURE — 97112 NEUROMUSCULAR REEDUCATION: CPT | Performed by: PHYSICAL THERAPIST

## 2023-05-08 PROCEDURE — 97161 PT EVAL LOW COMPLEX 20 MIN: CPT | Performed by: PHYSICAL THERAPIST

## 2023-05-08 PROCEDURE — 97110 THERAPEUTIC EXERCISES: CPT | Performed by: PHYSICAL THERAPIST

## 2023-05-08 NOTE — PATIENT INSTRUCTIONS
Access Code: [email or web address]  URL: [email or web address]  Date: 05/08/2023  Prepared by: Melina Conrad    Exercises  - Supine Scapular Protraction in Flexion with Dumbbells  - 1-2 x daily - 15-30 reps  - Supine Shoulder Horizontal Abduction with Resistance  - 1-2 x daily - 15-30 reps  -   - 1-2 x daily - 15-30 reps  - Standing Single Shoulder Flexion Wall Slide with Palm Up  - 1-2 x daily - 15-30 reps  - Seated Shoulder Flexion AAROM with Pulley Behind  - 1-2 x daily - 15-30 reps    Pt education regarding clinical findings, nature of condition, PT POC, HEP, symptom mgmt.

## 2023-05-08 NOTE — PROGRESS NOTES
Physical Therapy Initial Evaluation and Plan of Care             [address]    [place] [address]    Patient: Amarjit Monsivais   : 1980  Diagnosis/ICD-10 Code:  Chronic right shoulder pain [M25.511, G89.29]  Referring practitioner: Beau Del Real,*  Date of Initial Visit: 2023  Today's Date: 2023  Patient seen for 1 session         Visit Diagnoses:    ICD-10-CM ICD-9-CM   1. Chronic right shoulder pain  M25.511 719.41    G89.29 338.29   2. Upper extremity weakness  R29.898 729.89         Subjective Questionnaire: QuickDASH: 40.91% impairment      Subjective Evaluation    History of Present Illness  Mechanism of injury: R shoulder pn for several years, no specific KELLY, contributes to work as a . Pn fairly localized to anterior shoulder, can radiate into the R side of the neck but denies pn into the arm. Denies N/T, popping/clicking. Had distal bicep rupture repair in 2021. Never felt a though he gained all of his strength back. Reports inability to tolerate lifting weights at the gym, lay on his R side to sleep, will wake w/ dull throbbing pn. Pn w/ forward reaching, lifting things out in front of himself as light as a cup of coffee, twisting motions of the shoulder.    Subjective comment: R shoulder pn  Patient Occupation: ; enjoys hunting, fishing, camping, hiking Quality of life: fair    Pain  Current pain ratin  At best pain ratin  At worst pain ratin  Quality: dull ache and sharp  Relieving factors: rest  Aggravating factors: lifting, movement, overhead activity, outstretched reach, sleeping and repetitive movement  Progression: worsening    Hand dominance: right    Diagnostic Tests  X-ray: abnormal (severe hypertrophic narrowing AC joint, greater tuberosity changes indicative of chronic cuff pathology)    Treatments  No previous or current treatments  Patient Goals  Patient goals for therapy: decreased pain, increased  motion, increased strength, independence with ADLs/IADLs and return to sport/leisure activities             Treatment  See Exercise, Manual, and Modality Logs for complete treatment.     Objective          Observations     Right Shoulder  Negative for atrophy and deformity.       Tenderness     Right Shoulder  Tenderness in the biceps tendon (proximal), coracoid process and subscapularis tendon. No tenderness in the AC joint, acromion, infraspinatus tendon and supraspinatus tendon.     Cervical/Thoracic Screen   Cervical range of motion within normal limits  Thoracic range of motion within normal limits    Active Range of Motion   Left Shoulder   Flexion: 170 degrees   Abduction: 170 degrees   External rotation 0°: 90 degrees   Internal rotation BTB: T10     Right Shoulder   Flexion: 150 degrees with pain  Abduction: 160 degrees with pain  External rotation 0°: 82 degrees with pain  Internal rotation BTB: L1     Additional Active Range of Motion Details  Ant shoulder pn all directions    Strength/Myotome Testing     Right Shoulder     Planes of Motion   Flexion: 5   Abduction: 5   External rotation at 0°: 4-   Internal rotation at 0°: 4+     Right Elbow   Flexion: 5  Extension: 5  Forearm supination: 5  Forearm pronation: 5    Additional Strength Details  Ant shoulder pn w/ resisted ER>IR>elevation    Tests   Cervical     Right   Positive active compression ([place]).     Right Shoulder   Positive Hawkin's, painful arc and Speed's.   Negative AC shear, belly press, clunk, crank, crossover, external rotation lag sign, full can, internal rotation lag sign and sulcus sign.             Assessment & Plan     Assessment  Impairments: abnormal or restricted ROM, activity intolerance, impaired physical strength, lacks appropriate home exercise program and pain with function  Functional Limitations: carrying objects, lifting, sleeping, pulling, pushing, uncomfortable because of pain, reaching behind back, reaching overhead  and unable to perform repetitive tasks  Assessment details: Pt is a 42 YOM who presents to PT w/ complaint of worsening, chronic R shoulder pn. Denies specific KELLY, contributes to career in diesel mechanics. Findings consistent w/ proximal bicep tendinopathy, potential subscap involvement. Primary deficits include painful and limited shoulder mobility, primarily rotation and end range elevation. TTP over LHBT, pec minor, subscap tendon. No TTP post cuff, AC jt. No complaints of popping/clicking. (+) speeds, HK/Neer. (-) AC shear, crossover test, full can, lag signs. Labral tests painful but w/o apparent popping/clicking. Pt's pn and deficits limit tolerance to sleeping, lifting, carrying, pushing, pulling. Pt would benefit from skilled PT services to address deficits and allow return to full work and daily activities w/o pn or limitation.    Barriers to therapy: n/a  Prognosis: good    Goals  Plan Goals: STG 3 wks  1) Pt to be compliant w/ initial HEP for ROM, strength and symptom mgmt.  2) Pt to report at least a 40% improvement in symptoms.  3) Pt to improve R shoulder ROM to 160 deg elevation, 90 deg ER or better w/ min to no reported pn to improve function w/ daily/work activities.  4) Pt to improve QD score to 30% impairment or better to reflect improved pn and function.    LTG 6 wks  1) Pt to be independent w/ long term HEP and self mgmt.  2) Pt to report at least a 75% improvement in symptoms.  3) Pt to demo 5/5 R shoulder strength w/ pn during testing not exceeding 2/10 to improve tolerance to daily/work activities  4) Pt to improve QD score to 20% impairment or better to reflect improved pn and function.       Plan  Therapy options: will be seen for skilled therapy services  Planned modality interventions: TENS, cryotherapy, thermotherapy (hydrocollator packs), ultrasound and dry needling  Planned therapy interventions: flexibility, functional ROM exercises, home exercise program, joint mobilization, manual  therapy, neuromuscular re-education, postural training, soft tissue mobilization, strengthening, stretching and therapeutic activities  Frequency: 1x week  Duration in weeks: 12  Treatment plan discussed with: patient  Plan details: PT POC to emphasize restoration of pn free shoulder mobility, scapular and shoulder strengthening, dynamic stabilization, and postural awareness utillizing TE/TA/NMR/MT, modalities as indicated for pn control        History # of Personal Factors and/or Comorbidities: LOW (0)  Examination of Body System(s): # of elements: LOW (1-2)  Clinical Presentation: EVOLVING  Clinical Decision Making: LOW       Timed:         Manual Therapy:    0     mins  25779;     Therapeutic Exercise:    15     mins  07361;     Neuromuscular Shawn:    10    mins  38720;    Therapeutic Activity:     0     mins  96359;     Gait Trainin     mins  92549;     Ultrasound:     0     mins  16394;    Ionto                               0    mins   22073  Self Care                       0     mins   97657  Canalith Repos    0     mins 80496      Un-Timed:  Electrical Stimulation:    0     mins  40413 (MC );  Dry Needling     0     mins self-pay  Traction     0     mins 51879  Low Eval     25     Mins  60999  Mod Eval     0     Mins  16256  High Eval                       0     Mins  45337        Timed Treatment:   25   mins   Total Treatment:     50   mins          PT: Melina Conrad PT     [place] License: #[id number]    Electronically signed by Melina Conrad PT, 23, 7:35 AM EDT    Certification Period: 2023 thru 2023  I certify that the therapy services are furnished while this patient is under my care.  The services outlined above are required by this patient, and will be reviewed every 90 days.         Physician Signature:__________________________________________________    PHYSICIAN: Beau Del Real MD      DATE:     Please sign and return via fax to [phone]. Thank you, [facility].

## 2023-05-16 ENCOUNTER — TREATMENT (OUTPATIENT)
Dept: PHYSICAL THERAPY | Facility: CLINIC | Age: 43
End: 2023-05-16
Payer: COMMERCIAL

## 2023-05-16 DIAGNOSIS — R29.898 UPPER EXTREMITY WEAKNESS: ICD-10-CM

## 2023-05-16 DIAGNOSIS — M25.511 CHRONIC RIGHT SHOULDER PAIN: Primary | ICD-10-CM

## 2023-05-16 DIAGNOSIS — G89.29 CHRONIC RIGHT SHOULDER PAIN: Primary | ICD-10-CM

## 2023-05-16 NOTE — PROGRESS NOTES
Physical Therapy Daily Treatment Note  [address]  [address]   [place] [address]      Patient: Amarjit Monsivais   : 1980  Referring practitioner: No ref. provider found  Date of Initial Visit: Type: THERAPY  Noted: 2023  Today's Date: 2023  Patient seen for 2 sessions       Visit Diagnoses:    ICD-10-CM ICD-9-CM   1. Chronic right shoulder pain  M25.511 719.41    G89.29 338.29   2. Upper extremity weakness  R29.898 729.89       Subjective   Amajrit Monsivais reports: Shoulder feeling ok. Not sure what he did but couldn't sleep Wednesday night, so took it easy on exercises Thursday/Friday. Pn mostly localized to anterior shoulder, though also feeling some pn radiate into the R side of his neck.    Pre tx pn score: 4  Post tx pn score: 4    Treatment  See Exercise, Manual, and Modality Logs for complete treatment.     Objective         Assessment & Plan     Assessment    Assessment details: Pt compliant w/ initial HEP and reportedly tolerating exercises well. Shoulder mobility appears to be improved, less painful. Also had improved exercise tolerance, able to complete band pull aparts in standing w/o issue. Demo medial border winging R scapula so focused on scapular stabilization exercises, cuff strengthening. He tolerated all well. Noted rapid fatigue w/ resisted ER. No inc in pn reported at end of visit. Discussed adding TB walkout, standing band pullaparts, wall scap push up to HEP. He verbalized understanding.    Plan  Plan details: Cont w/ scapular stabilization          Timed:         Manual Therapy:    0     mins  39672;     Therapeutic Exercise:    25     mins  08517;     Neuromuscular Shawn:    10    mins  97228;    Therapeutic Activity:     0     mins  95063;     Gait Trainin     mins  87608;     Ultrasound:     0     mins  04682;    Ionto                               0    mins   74189  Self Care                       0     mins   96187  Canalith Repos    0     mins  68219      Un-Timed:  Electrical Stimulation:    0     mins  25009 ( );  Dry Needling     0     mins self-pay  Traction     0     mins 58515      Timed Treatment:   35   mins   Total Treatment:     35   mins    Melina Conrad, PT  [place] License: #[id number]

## 2023-05-22 ENCOUNTER — TREATMENT (OUTPATIENT)
Dept: PHYSICAL THERAPY | Facility: CLINIC | Age: 43
End: 2023-05-22
Payer: COMMERCIAL

## 2023-05-22 DIAGNOSIS — M25.511 CHRONIC RIGHT SHOULDER PAIN: Primary | ICD-10-CM

## 2023-05-22 DIAGNOSIS — R29.898 UPPER EXTREMITY WEAKNESS: ICD-10-CM

## 2023-05-22 DIAGNOSIS — G89.29 CHRONIC RIGHT SHOULDER PAIN: Primary | ICD-10-CM

## 2023-05-22 PROCEDURE — 97112 NEUROMUSCULAR REEDUCATION: CPT | Performed by: PHYSICAL THERAPIST

## 2023-05-22 PROCEDURE — 97110 THERAPEUTIC EXERCISES: CPT | Performed by: PHYSICAL THERAPIST

## 2023-05-22 NOTE — PATIENT INSTRUCTIONS
Access Code: [email or web address]  URL: [email or web address]  Date: 05/22/2023  Prepared by: Melina Conrad    Exercises  - Seated Shoulder Flexion AAROM with Pulley Behind  - 1 x daily - 15-30 reps  - Plank on Table with Scapular Protraction Retraction  - 1 x daily - 15-30 reps  - Standing Shoulder Horizontal Abduction with Resistance  - 1 x daily - 15-30 reps  - Eccentric Shoulder Flexion  - 1 x daily - 15-30 reps  -  with Resistance  - 1 x daily - 15-30 reps  - Shoulder External Rotation with Anchored Resistance  - 1 x daily - 15-30 reps

## 2023-05-22 NOTE — PROGRESS NOTES
"Physical Therapy Daily Treatment Note  [address]  [address]   [place] [address]      Patient: Amarjit Monsivais   : 1980  Referring practitioner: No ref. provider found  Date of Initial Visit: Type: THERAPY  Noted: 2023  Today's Date: 2023  Patient seen for 3 sessions       Visit Diagnoses:    ICD-10-CM ICD-9-CM   1. Chronic right shoulder pain  M25.511 719.41    G89.29 338.29   2. Upper extremity weakness  R29.898 729.89       Subjective   Amarjit Monsivais reports: \"I'll be honest, I didn't work on my exercises last week. I was busy with work and took a trip out of town to go kayaking and camping over the weekend. I hadn't kayaked since my shoulder had been hurting but it didn't do too badly.\" Shoulder still wakes him at night occasionally, usually if laying flat on his back or on his R side.    Pre tx pn score: 2  Post tx pn score: 2    Treatment  See Exercise, Manual, and Modality Logs for complete treatment.     Objective         Assessment & Plan     Assessment    Assessment details: Min reported pn today. Exercise participation however minimal over past week. Was able to tolerate exercise progressions well w/ little to no inc in baseline pn. Subjectively reports ongoing weakness/pn w/ repetitive or heavy activity at shoulder height and overhead. Issued updated HEP and gave TB for home use. Would benefit from ongoing PT to maximize shoulder strength/stability.    Plan  Plan details: Cont w/ progressive cuff/bicep loading as tolerated          Timed:         Manual Therapy:    0     mins  09988;     Therapeutic Exercise:    15     mins  65512;     Neuromuscular Shawn:    10    mins  70582;    Therapeutic Activity:     10     mins  00846;     Gait Trainin     mins  90136;     Ultrasound:     0     mins  77741;    Ionto                               0    mins   94216  Self Care                       0     mins   60187  Canalith Repos    0     mins 47882      Un-Timed:  Electrical " Stimulation:    0     mins  32112 ( );  Dry Needling     0     mins self-pay  Traction     0     mins 54973      Timed Treatment:   35   mins   Total Treatment:     35   mins    Melina Conrad, PT  [place] License: #[id number]

## 2023-05-31 ENCOUNTER — TREATMENT (OUTPATIENT)
Dept: PHYSICAL THERAPY | Facility: CLINIC | Age: 43
End: 2023-05-31

## 2023-05-31 DIAGNOSIS — G89.29 CHRONIC RIGHT SHOULDER PAIN: Primary | ICD-10-CM

## 2023-05-31 DIAGNOSIS — R29.898 UPPER EXTREMITY WEAKNESS: ICD-10-CM

## 2023-05-31 DIAGNOSIS — M25.511 CHRONIC RIGHT SHOULDER PAIN: Primary | ICD-10-CM

## 2023-05-31 PROCEDURE — 97110 THERAPEUTIC EXERCISES: CPT | Performed by: PHYSICAL THERAPIST

## 2023-05-31 PROCEDURE — 97112 NEUROMUSCULAR REEDUCATION: CPT | Performed by: PHYSICAL THERAPIST

## 2023-05-31 NOTE — PROGRESS NOTES
Physical Therapy Daily Treatment Note  [address]  [address]   [place] [address]      Patient: Amarjit Monsivais   : 1980  Referring practitioner: Beau Del Real,*  Date of Initial Visit: Type: THERAPY  Noted: 2023  Today's Date: 2023  Patient seen for 4 sessions       Visit Diagnoses:    ICD-10-CM ICD-9-CM   1. Chronic right shoulder pain  M25.511 719.41    G89.29 338.29   2. Upper extremity weakness  R29.898 729.89       Subjective   Amarjit Monsivais reports: Worked on exercises more consistently over the past week and is feeling better.  Sleeping better and having less pn during the day. Resting pn level now around 3/10 vs 6-7/10. Still feels weak.    Pre tx pn score: 3  Post tx pn score: 3    Treatment  See Exercise, Manual, and Modality Logs for complete treatment.     Objective       Right Shoulder   Flexion: 160 degrees with mild pn  Abduction: 165 degrees with pain at approx 120 deg  External rotation 0°: 82 degrees with pain  Internal rotation BTB: T10 w/ tightness, min pn     Assessment & Plan     Assessment    Assessment details: Showing some improvement w/ shoulder mobility. Remains painful at approx 120 deg elevation, end range ER. Min to no pn w/ IR BTB. Pn localized to anterolateral shoulder. Overall symptoms improved, especially w/ increased participation in HEP. Proceeded w/ strengthening as outlined in chart. Tolerated all well. Very challenged w/ resisted ER but min discomfort. Would benefit from ongoing PT.    Plan  Plan details: Cont w/ strengthening progressions. May initiate push up series if handy          Timed:         Manual Therapy:    0     mins  39653;     Therapeutic Exercise:    25     mins  22410;     Neuromuscular Shawn:    15    mins  24923;    Therapeutic Activity:     0     mins  71688;     Gait Trainin     mins  29417;     Ultrasound:     0     mins  98919;    Ionto                               0    mins   56112  Self Care                       0      mins   59314  Canalith Repos    0     mins 53128      Un-Timed:  Electrical Stimulation:    0     mins  35037 ( );  Dry Needling     0     mins self-pay  Traction     0     mins 91331      Timed Treatment:   40   mins   Total Treatment:     40   mins    Melina Conrad, PT  [place] License: #[id number]

## 2023-06-05 ENCOUNTER — TREATMENT (OUTPATIENT)
Dept: PHYSICAL THERAPY | Facility: CLINIC | Age: 43
End: 2023-06-05
Payer: OTHER MISCELLANEOUS

## 2023-06-05 DIAGNOSIS — M25.511 CHRONIC RIGHT SHOULDER PAIN: Primary | ICD-10-CM

## 2023-06-05 DIAGNOSIS — R29.898 UPPER EXTREMITY WEAKNESS: ICD-10-CM

## 2023-06-05 DIAGNOSIS — G89.29 CHRONIC RIGHT SHOULDER PAIN: Primary | ICD-10-CM

## 2023-06-05 NOTE — PATIENT INSTRUCTIONS
Access Code: [email or web address]  URL: [email or web address]  Date: 06/05/2023  Prepared by: Melina Conrad    Exercises  - Plank on Table with Scapular Protraction Retraction  - 1 x daily - 15-30 reps  - Push Up on Table  - 1 x daily - 15-30 reps  - Eccentric Shoulder Flexion  - 1 x daily - 15-30 reps  - Shoulder External Rotation with Anchored Resistance  - 1 x daily - 15-30 reps  - Standing Shoulder Horizontal and Diagonal Pulls with Resistance  - 1 x daily - 15-30 reps  - Standing Row with Resistance with Anchored Resistance at Chest Height Palms Down  - 1 x daily - 15-30 reps

## 2023-06-05 NOTE — PROGRESS NOTES
Physical Therapy Reassessment/Discharge Summary  [address] [facility] [address]    [place] [address]  Patient: Amarjit Monsivais   : 1980  Diagnosis/ICD-10 Code:  Chronic right shoulder pain [M25.511, G89.29]  Referring practitioner: Beau Del Real,*  Date of Initial Visit: 2023  Today's Date: 2023  Patient seen for 5 sessions         Visit Diagnoses:    ICD-10-CM ICD-9-CM   1. Chronic right shoulder pain  M25.511 719.41    G89.29 338.29   2. Upper extremity weakness  R29.898 729.89       Subjective Questionnaire: QuickDASH: 22.73% impairment  Clinical Progress: improved  Home Program Compliance: Yes  Treatment has included: therapeutic exercise, neuromuscular re-education, manual therapy, and therapeutic activity      Subjective   Amarjit Monsivais reports: Feeling better every day. Rates symptoms as 75% improved. Can tell his exercises are helping. Having virtually no pn at rest. Some discomfort in top of shoulder if he leans through his R elbow while driving. Able to simulate pulling his bow w/ min discomfort now. Was able to play Viximo over the weekend w/o issue. Feels he has regained full shoulder mobility. No current concerns w/ daily/work activities.    Pre tx pn score: 0  Post tx pn score: 0    Treatment  See Exercise, Manual, and Modality Logs for complete treatment.     Objective          Tenderness     Right Shoulder  Tenderness in the biceps tendon (proximal). No tenderness in the AC joint, acromion, coracoid process, infraspinatus tendon, subscapularis tendon and supraspinatus tendon.     Active Range of Motion   Left Shoulder   Flexion: 170 degrees   Abduction: 170 degrees   External rotation 0°: 90 degrees   Internal rotation BTB: T10     Right Shoulder   Flexion: 170 degrees   Abduction: 165 degrees   External rotation 0°: 88 degrees   Internal rotation BTB: T10     Strength/Myotome Testing     Right Shoulder     Planes of Motion   Flexion: 5   Abduction: 5   External rotation at  0°: 5 (mild pn posterior shoulder w/ max resistance)   Internal rotation at 0°: 5     Right Elbow   Flexion: 5  Extension: 5  Forearm supination: 5  Forearm pronation: 5    Tests   Cervical     Right   Negative active compression ([place]).     Right Shoulder   Positive Hawkin's.   Negative AC shear, belly press, clunk, crank, crossover, external rotation lag sign, full can, internal rotation lag sign, painful arc, Speed's and sulcus sign.           Assessment & Plan     Assessment    Assessment details: Reassessment performed. Pt has completed 5 PT visits. He rates symptoms as 75% improved from start of care. He demonstrates restored shoulder mobility in all planes, 5/5 strength w/ min pn upon testing. QD score improved from 40% to 22% perceived impairment. He has met nearly all goals set for PT and denies any current concerns w/ work or daily activities. Pt agreeable to trial of independent mgmt. Updated HEP issued. Pt agrees to contact PT w/ any concerns.    Goals  Plan Goals: STG 3 wks  1) Pt to be compliant w/ initial HEP for ROM, strength and symptom mgmt.-MET  2) Pt to report at least a 40% improvement in symptoms.-MET  3) Pt to improve R shoulder ROM to 160 deg elevation, 90 deg ER or better w/ min to no reported pn to improve function w/ daily/work activities.-MET  4) Pt to improve QD score to 30% impairment or better to reflect improved pn and function.-MET    LTG 6 wks  1) Pt to be independent w/ long term HEP and self mgmt.-MET  2) Pt to report at least a 75% improvement in symptoms.-MET  3) Pt to demo 5/5 R shoulder strength w/ pn during testing not exceeding 2/10 to improve tolerance to daily/work activities-MET  4) Pt to improve QD score to 20% impairment or better to reflect improved pn and function.-PROGRESSING    Plan  Plan details: D/c to independent HEP      Progress toward previous goals: Partially Met        Timed:         Manual Therapy:    0     mins  81673;     Therapeutic Exercise:    10     mins  07167;     Neuromuscular Shawn:    8    mins  17601;    Therapeutic Activity:     20     mins  76705;     Gait Trainin     mins  84826;     Ultrasound:     0     mins  52640;    Ionto                               0    mins   82123  Self Care                       0     mins   46803  Canalith Repos    0     mins 82493      Un-Timed:  Electrical Stimulation:    0     mins  38155 ( );  Dry Needling     0     mins self-pay  Traction     0     mins 06857  Re-Eval                           0    mins  51770      Timed Treatment:   38   mins   Total Treatment:     38   mins          PT: Melina Conrad, PT     [place] License: #[id number]    Electronically signed by Melina Conrad, PT, 23, 8:06 AM EDT

## 2024-08-02 NOTE — PROGRESS NOTES
"Patient: Amarjit Monsivais    YOB: 1980    Date: 08/08/2024    Primary Care Provider: Hammad Dias DO    Chief Complaint   Patient presents with    Abdominal Pain       SUBJECTIVE:    History of present illness: In June patient had an episode of epigastric pressure and indigestion/heartburn.  Went to the emergency room and no significant abnormality was found at that time.  Since then he has had no further abdominal complaints but he has intermittent indigestion and sometimes feels \"acid in his throat \".  No aggravating features.  Eating is not an issue.  He had a HIDA scan and had no symptoms with the HIDA scan.    The following portions of the patient's history were reviewed and updated as appropriate: allergies, current medications, past family history, past medical history, past social history, past surgical history and problem list.      Review of Systems   Constitutional:  Negative for chills, fever and unexpected weight change.   HENT:  Negative for trouble swallowing and voice change.    Eyes:  Negative for visual disturbance.   Respiratory:  Negative for apnea, cough, chest tightness, shortness of breath and wheezing.    Cardiovascular:  Negative for chest pain, palpitations and leg swelling.   Endocrine: Negative for cold intolerance and heat intolerance.   Genitourinary:  Negative for difficulty urinating, dysuria, flank pain, scrotal swelling and testicular pain.   Musculoskeletal:  Negative for back pain, gait problem and joint swelling.   Skin:  Negative for color change, rash and wound.   Neurological:  Negative for dizziness, syncope, speech difficulty, weakness, numbness and headaches.   Hematological:  Negative for adenopathy. Does not bruise/bleed easily.   Psychiatric/Behavioral:  Negative for confusion. The patient is not nervous/anxious.          Allergies:  Allergies   Allergen Reactions    Bactrim [Sulfamethoxazole-Trimethoprim] Myalgia    Sulfa Antibiotics Myalgia " "      Medications:  No current outpatient medications on file.    History:  History reviewed. No pertinent past medical history.    Past Surgical History:   Procedure Laterality Date    APPENDECTOMY      ELBOW PROCEDURE Right 09/08/2021     Reinsertion right distal biceps tendon with anthrex button implant set    EYE SURGERY  2007    METAL IN EYE     FINGER SURGERY  2009    RIGHT THUMB    HERNIA REPAIR      KNEE SURGERY  2013       History reviewed. No pertinent family history.    Social History     Tobacco Use    Smoking status: Former     Current packs/day: 0.00     Types: Cigarettes, Electronic Cigarette     Quit date: 2021     Years since quitting: 3.6    Smokeless tobacco: Never    Tobacco comments:     Uses Zyn pouch-contains nicotine   Vaping Use    Vaping status: Former   Substance Use Topics    Alcohol use: Yes     Alcohol/week: 3.0 - 4.0 standard drinks of alcohol     Types: 3 - 4 Cans of beer per week    Drug use: Yes     Types: Marijuana     Comment: COUPLE TIMES A WEEK         OBJECTIVE:    Vital Signs:   Vitals:    08/08/24 1444   BP: 136/72   Pulse: 91   Temp: 98.2 °F (36.8 °C)   SpO2: 97%   Weight: 106 kg (234 lb)   Height: 175.3 cm (69.02\")       Physical Exam:       General Appearance:    Alert, cooperative, in no acute distress   Head:    Normocephalic, without obvious abnormality, atraumatic   Eyes:            Normal.  No scleral icterus.  PERRLA    Lungs:     Clear to auscultation,respirations regular, even and                  unlabored    Heart:    Regular rhythm and normal rate, normal S1 and S2, no            murmur   Abdomen:   Soft and nontender and nondistended.   Extremities:   Moves all extremities well, no edema, no cyanosis, no             redness   Skin:   No bleeding, bruising or rash   Neurologic:   Normal without gross deficits.   Psychiatric: No evidence of depression or anxiety          Results Review:   I reviewed the patient's new clinical results.    Review of Systems was " reviewed and confirmed as accurate as documented by the MA.    ASSESSMENT/PLAN:    1. Biliary dyskinesia    2. Gastroesophageal reflux disease without esophagitis        Not convinced she is having gallbladder issues as he does not have typical symptoms for biliary colic.  His HIDA scan was abnormal but without reproduction of symptoms.  His abdominal complaints have resolved.  I would like to treat him for possible GERD for 2 weeks with a PPI and go from there.  Certainly if he has additional abdominal complaints we can entertain a cholecystectomy at that time.  Will also consider an EGD if symptoms persist.  Patient is agreeable and at this time does not wish to proceed with a cholecystectomy.  Follow-up in 2 weeks.  Sooner if he has worsening issues          Electronically signed by Mio Wylie MD  08/08/24

## 2024-08-08 ENCOUNTER — OFFICE VISIT (OUTPATIENT)
Dept: SURGERY | Facility: CLINIC | Age: 44
End: 2024-08-08
Payer: COMMERCIAL

## 2024-08-08 VITALS
WEIGHT: 234 LBS | SYSTOLIC BLOOD PRESSURE: 136 MMHG | HEART RATE: 91 BPM | BODY MASS INDEX: 34.66 KG/M2 | OXYGEN SATURATION: 97 % | TEMPERATURE: 98.2 F | HEIGHT: 69 IN | DIASTOLIC BLOOD PRESSURE: 72 MMHG

## 2024-08-08 DIAGNOSIS — K21.9 GASTROESOPHAGEAL REFLUX DISEASE WITHOUT ESOPHAGITIS: ICD-10-CM

## 2024-08-08 DIAGNOSIS — K82.8 BILIARY DYSKINESIA: Primary | ICD-10-CM

## 2024-08-08 PROCEDURE — 99204 OFFICE O/P NEW MOD 45 MIN: CPT | Performed by: SURGERY

## 2024-08-08 RX ORDER — OMEPRAZOLE 20 MG/1
40 CAPSULE, DELAYED RELEASE ORAL 2 TIMES DAILY
Qty: 14 CAPSULE | Refills: 0 | Status: SHIPPED | OUTPATIENT
Start: 2024-08-08 | End: 2024-08-08 | Stop reason: ALTCHOICE

## 2024-08-08 RX ORDER — OMEPRAZOLE 20 MG/1
40 CAPSULE, DELAYED RELEASE ORAL DAILY
Qty: 14 CAPSULE | Refills: 0 | Status: SHIPPED | OUTPATIENT
Start: 2024-08-08 | End: 2024-08-22

## 2024-08-22 ENCOUNTER — TELEPHONE (OUTPATIENT)
Dept: SURGERY | Facility: CLINIC | Age: 44
End: 2024-08-22

## 2024-08-22 DIAGNOSIS — K21.9 GASTROESOPHAGEAL REFLUX DISEASE WITHOUT ESOPHAGITIS: Primary | ICD-10-CM

## 2024-08-22 RX ORDER — PANTOPRAZOLE SODIUM 20 MG/1
40 TABLET, DELAYED RELEASE ORAL DAILY
Qty: 28 TABLET | Refills: 0 | Status: SHIPPED | OUTPATIENT
Start: 2024-08-22 | End: 2024-09-05